# Patient Record
Sex: FEMALE | Race: WHITE | NOT HISPANIC OR LATINO | Employment: OTHER | URBAN - METROPOLITAN AREA
[De-identification: names, ages, dates, MRNs, and addresses within clinical notes are randomized per-mention and may not be internally consistent; named-entity substitution may affect disease eponyms.]

---

## 2023-05-02 DIAGNOSIS — E03.8 OTHER SPECIFIED HYPOTHYROIDISM: Primary | ICD-10-CM

## 2023-05-02 PROBLEM — E03.9 HYPOTHYROIDISM: Status: ACTIVE | Noted: 2023-05-02

## 2023-05-02 RX ORDER — LEVOTHYROXINE SODIUM 75 UG/1
1 TABLET ORAL DAILY
COMMUNITY
Start: 2014-12-29 | End: 2024-01-18 | Stop reason: SDUPTHER

## 2023-05-02 RX ORDER — LEVOTHYROXINE SODIUM 75 UG/1
TABLET ORAL
Qty: 30 TABLET | Refills: 0 | Status: SHIPPED | OUTPATIENT
Start: 2023-05-02 | End: 2023-08-29

## 2023-08-29 DIAGNOSIS — E03.8 OTHER SPECIFIED HYPOTHYROIDISM: ICD-10-CM

## 2023-08-29 RX ORDER — LEVOTHYROXINE SODIUM 75 UG/1
TABLET ORAL
Qty: 30 TABLET | Refills: 11 | Status: SHIPPED | OUTPATIENT
Start: 2023-08-29 | End: 2023-11-01 | Stop reason: SDUPTHER

## 2023-10-10 DIAGNOSIS — F41.9 ANXIETY: Primary | ICD-10-CM

## 2023-10-10 RX ORDER — ESCITALOPRAM OXALATE 10 MG/1
10 TABLET ORAL DAILY
Qty: 90 TABLET | Refills: 3 | Status: SHIPPED | OUTPATIENT
Start: 2023-10-10 | End: 2023-10-13 | Stop reason: SDUPTHER

## 2023-10-13 DIAGNOSIS — F41.9 ANXIETY: ICD-10-CM

## 2023-10-13 DIAGNOSIS — E03.9 HYPOTHYROIDISM, UNSPECIFIED TYPE: ICD-10-CM

## 2023-10-13 RX ORDER — ESCITALOPRAM OXALATE 10 MG/1
10 TABLET ORAL DAILY
Qty: 90 TABLET | Refills: 3 | Status: SHIPPED | OUTPATIENT
Start: 2023-10-13

## 2023-10-13 RX ORDER — LEVOTHYROXINE SODIUM 75 UG/1
75 TABLET ORAL DAILY
Qty: 90 TABLET | Refills: 2 | Status: CANCELLED | OUTPATIENT
Start: 2023-10-13

## 2023-10-16 ENCOUNTER — LAB (OUTPATIENT)
Dept: LAB | Facility: LAB | Age: 78
End: 2023-10-16
Payer: MEDICARE

## 2023-10-16 DIAGNOSIS — E03.9 HYPOTHYROIDISM, UNSPECIFIED TYPE: ICD-10-CM

## 2023-10-16 LAB — TSH SERPL-ACNC: 3.58 MIU/L (ref 0.44–3.98)

## 2023-10-16 PROCEDURE — 36415 COLL VENOUS BLD VENIPUNCTURE: CPT

## 2023-10-16 PROCEDURE — 84443 ASSAY THYROID STIM HORMONE: CPT

## 2023-10-31 PROBLEM — I10 BENIGN ESSENTIAL HYPERTENSION: Status: ACTIVE | Noted: 2023-10-31

## 2023-10-31 PROBLEM — M81.0 OSTEOPOROSIS: Status: ACTIVE | Noted: 2023-10-31

## 2023-10-31 PROBLEM — G35 MULTIPLE SCLEROSIS (MULTI): Status: ACTIVE | Noted: 2023-10-31

## 2023-10-31 PROBLEM — N32.81 OVERACTIVE BLADDER: Status: ACTIVE | Noted: 2023-10-31

## 2023-10-31 PROBLEM — M25.50 ARTHRALGIA: Status: ACTIVE | Noted: 2023-10-31

## 2023-10-31 PROBLEM — G47.00 INSOMNIA: Status: ACTIVE | Noted: 2023-10-31

## 2023-10-31 PROBLEM — M19.90 OSTEOARTHROSIS: Status: ACTIVE | Noted: 2023-10-31

## 2023-10-31 PROBLEM — E78.5 HYPERLIPIDEMIA: Status: ACTIVE | Noted: 2023-10-31

## 2023-10-31 PROBLEM — M48.062 LUMBAR STENOSIS WITH NEUROGENIC CLAUDICATION: Status: ACTIVE | Noted: 2023-10-31

## 2023-10-31 PROBLEM — L65.9 ALOPECIA: Status: ACTIVE | Noted: 2023-10-31

## 2023-10-31 PROBLEM — F41.9 ANXIETY DISORDER: Status: ACTIVE | Noted: 2023-10-31

## 2023-10-31 RX ORDER — NAPROXEN SODIUM 220 MG/1
1 TABLET, FILM COATED ORAL DAILY
COMMUNITY

## 2023-10-31 RX ORDER — POLYETHYLENE GLYCOL 3350 17 G/17G
POWDER, FOR SOLUTION ORAL
COMMUNITY
Start: 2013-06-28

## 2023-10-31 RX ORDER — PRAVASTATIN SODIUM 20 MG/1
TABLET ORAL
COMMUNITY
Start: 2021-07-19 | End: 2023-12-26

## 2023-10-31 RX ORDER — MIRABEGRON 25 MG/1
TABLET, FILM COATED, EXTENDED RELEASE ORAL
COMMUNITY
Start: 2022-04-04

## 2023-10-31 RX ORDER — MINERAL OIL
ENEMA (ML) RECTAL
COMMUNITY
Start: 2013-06-28

## 2023-10-31 RX ORDER — SOLIFENACIN SUCCINATE 5 MG/1
TABLET, FILM COATED ORAL
COMMUNITY
Start: 2019-03-14

## 2023-10-31 RX ORDER — METOPROLOL SUCCINATE 25 MG/1
TABLET, EXTENDED RELEASE ORAL
COMMUNITY
Start: 2020-07-17 | End: 2023-12-26

## 2023-11-01 ENCOUNTER — OFFICE VISIT (OUTPATIENT)
Dept: PRIMARY CARE | Facility: CLINIC | Age: 78
End: 2023-11-01
Payer: MEDICARE

## 2023-11-01 VITALS
HEIGHT: 60 IN | OXYGEN SATURATION: 97 % | TEMPERATURE: 97 F | RESPIRATION RATE: 16 BRPM | DIASTOLIC BLOOD PRESSURE: 80 MMHG | BODY MASS INDEX: 30.47 KG/M2 | SYSTOLIC BLOOD PRESSURE: 137 MMHG | HEART RATE: 56 BPM | WEIGHT: 155.2 LBS

## 2023-11-01 DIAGNOSIS — E78.2 MIXED HYPERLIPIDEMIA: ICD-10-CM

## 2023-11-01 DIAGNOSIS — F41.1 GENERALIZED ANXIETY DISORDER: ICD-10-CM

## 2023-11-01 DIAGNOSIS — E03.8 OTHER SPECIFIED HYPOTHYROIDISM: ICD-10-CM

## 2023-11-01 DIAGNOSIS — I10 BENIGN ESSENTIAL HYPERTENSION: ICD-10-CM

## 2023-11-01 DIAGNOSIS — M81.0 AGE-RELATED OSTEOPOROSIS WITHOUT CURRENT PATHOLOGICAL FRACTURE: ICD-10-CM

## 2023-11-01 DIAGNOSIS — Z23 NEED FOR VACCINATION: ICD-10-CM

## 2023-11-01 DIAGNOSIS — Z00.00 ROUTINE GENERAL MEDICAL EXAMINATION AT HEALTH CARE FACILITY: Primary | ICD-10-CM

## 2023-11-01 DIAGNOSIS — Z12.31 ENCOUNTER FOR SCREENING MAMMOGRAM FOR BREAST CANCER: ICD-10-CM

## 2023-11-01 DIAGNOSIS — G35 MULTIPLE SCLEROSIS (MULTI): ICD-10-CM

## 2023-11-01 PROCEDURE — 1160F RVW MEDS BY RX/DR IN RCRD: CPT | Performed by: INTERNAL MEDICINE

## 2023-11-01 PROCEDURE — 3079F DIAST BP 80-89 MM HG: CPT | Performed by: INTERNAL MEDICINE

## 2023-11-01 PROCEDURE — 90677 PCV20 VACCINE IM: CPT | Performed by: INTERNAL MEDICINE

## 2023-11-01 PROCEDURE — 99214 OFFICE O/P EST MOD 30 MIN: CPT | Performed by: INTERNAL MEDICINE

## 2023-11-01 PROCEDURE — 1170F FXNL STATUS ASSESSED: CPT | Performed by: INTERNAL MEDICINE

## 2023-11-01 PROCEDURE — 1159F MED LIST DOCD IN RCRD: CPT | Performed by: INTERNAL MEDICINE

## 2023-11-01 PROCEDURE — G0439 PPPS, SUBSEQ VISIT: HCPCS | Performed by: INTERNAL MEDICINE

## 2023-11-01 PROCEDURE — G0009 ADMIN PNEUMOCOCCAL VACCINE: HCPCS | Performed by: INTERNAL MEDICINE

## 2023-11-01 PROCEDURE — 1125F AMNT PAIN NOTED PAIN PRSNT: CPT | Performed by: INTERNAL MEDICINE

## 2023-11-01 PROCEDURE — 1036F TOBACCO NON-USER: CPT | Performed by: INTERNAL MEDICINE

## 2023-11-01 PROCEDURE — 3075F SYST BP GE 130 - 139MM HG: CPT | Performed by: INTERNAL MEDICINE

## 2023-11-01 ASSESSMENT — ENCOUNTER SYMPTOMS
BRUISES/BLEEDS EASILY: 0
HEADACHES: 0
EYE REDNESS: 0
SORE THROAT: 0
NECK STIFFNESS: 0
GASTROINTESTINAL NEGATIVE: 1
TROUBLE SWALLOWING: 0
UNEXPECTED WEIGHT CHANGE: 0
LIGHT-HEADEDNESS: 0
PSYCHIATRIC NEGATIVE: 1
ADENOPATHY: 0
EYES NEGATIVE: 1
POLYPHAGIA: 0
BLOOD IN STOOL: 0
SHORTNESS OF BREATH: 0
BACK PAIN: 1
DIZZINESS: 0
SPEECH DIFFICULTY: 0
VOMITING: 0
ALLERGIC/IMMUNOLOGIC NEGATIVE: 1
VOICE CHANGE: 0
WEAKNESS: 0
EYE DISCHARGE: 0
FREQUENCY: 0
LOSS OF SENSATION IN FEET: 0
EYE PAIN: 0
DIARRHEA: 0
NECK PAIN: 0
OCCASIONAL FEELINGS OF UNSTEADINESS: 0
TREMORS: 0
WOUND: 0
DEPRESSION: 0
HALLUCINATIONS: 0
CONFUSION: 0
FLANK PAIN: 0
SINUS PAIN: 0
APPETITE CHANGE: 0
JOINT SWELLING: 0
PALPITATIONS: 0
ARTHRALGIAS: 1
AGITATION: 0
HEMATOLOGIC/LYMPHATIC NEGATIVE: 1
NUMBNESS: 0
SINUS PRESSURE: 0
STRIDOR: 0
NAUSEA: 0
NEUROLOGICAL NEGATIVE: 1
WHEEZING: 0
SEIZURES: 0
CARDIOVASCULAR NEGATIVE: 1
CONSTIPATION: 0
ABDOMINAL PAIN: 0
RESPIRATORY NEGATIVE: 1
ACTIVITY CHANGE: 0
DIFFICULTY URINATING: 0
DYSURIA: 0
COLOR CHANGE: 0
MYALGIAS: 0
FACIAL ASYMMETRY: 0
CHEST TIGHTNESS: 0
NERVOUS/ANXIOUS: 0
SLEEP DISTURBANCE: 0
ENDOCRINE NEGATIVE: 1
COUGH: 0
CONSTITUTIONAL NEGATIVE: 1
POLYDIPSIA: 0

## 2023-11-01 ASSESSMENT — ACTIVITIES OF DAILY LIVING (ADL)
BATHING: INDEPENDENT
DRESSING: INDEPENDENT
GROCERY_SHOPPING: INDEPENDENT
DOING_HOUSEWORK: INDEPENDENT
MANAGING_FINANCES: INDEPENDENT
TAKING_MEDICATION: INDEPENDENT

## 2023-11-01 ASSESSMENT — PATIENT HEALTH QUESTIONNAIRE - PHQ9
1. LITTLE INTEREST OR PLEASURE IN DOING THINGS: NOT AT ALL
SUM OF ALL RESPONSES TO PHQ9 QUESTIONS 1 AND 2: 0
2. FEELING DOWN, DEPRESSED OR HOPELESS: NOT AT ALL

## 2023-11-01 NOTE — PROGRESS NOTES
Subjective   Reason for Visit: Jenni Friedman is an 78 y.o. female here for a Medicare Wellness visit.     Past Medical, Surgical, and Family History reviewed and updated in chart.    Reviewed all medications by prescribing practitioner or clinical pharmacist (such as prescriptions, OTCs, herbal therapies and supplements) and documented in the medical record.    [facility]    Patient Care Team:  Bouchra Harris MD PhD as PCP - General     Patient has been feeling pretty good and has been complaint with prescribed medications.    She  accidentally fell in October 2022 at home.  Sustained right wrist fracture.  Saw ortho Dr. Reynolds, required surgery which was performed in Nov 2022 at [facility].   Completed OT, doing well now, full range of motion returned.     We reviewed and discussed details of recent blood work: CBC, CMP, TSH, Lipid panel, Hb A1c, Vit D done in 2022 and TSH in 2023.  Results within acceptable range.  Mildly elevated cholesterol and glucose  noted.       Mammogram: 1/2022  DEXA: 1/2022: osteoporosis  Adv Dir:  POA forms; on file. No Living will on file  Pneumonia vacc: 2018    Her CT CAC in 2017 was zero.   Recent colonoscopy (7/20/17) was unremarkable.     MS symptoms stable.  Follows with neurology as needed.        Review of Systems   Constitutional: Negative.  Negative for activity change, appetite change and unexpected weight change.   HENT: Negative.  Negative for congestion, ear discharge, ear pain, hearing loss, mouth sores, nosebleeds, sinus pressure, sinus pain, sore throat, trouble swallowing and voice change.    Eyes: Negative.  Negative for pain, discharge, redness and visual disturbance.   Respiratory: Negative.  Negative for cough, chest tightness, shortness of breath, wheezing and stridor.    Cardiovascular: Negative.  Negative for chest pain, palpitations and leg swelling.   Gastrointestinal: Negative.  Negative for abdominal pain, blood in stool, constipation, diarrhea, nausea  and vomiting.   Endocrine: Negative.  Negative for polydipsia, polyphagia and polyuria.   Genitourinary: Negative.  Negative for difficulty urinating, dysuria, flank pain, frequency and urgency.   Musculoskeletal:  Positive for arthralgias and back pain. Negative for gait problem, joint swelling, myalgias, neck pain and neck stiffness.   Skin: Negative.  Negative for color change, rash and wound.   Allergic/Immunologic: Negative.  Negative for environmental allergies, food allergies and immunocompromised state.   Neurological: Negative.  Negative for dizziness, tremors, seizures, syncope, facial asymmetry, speech difficulty, weakness, light-headedness, numbness and headaches.   Hematological: Negative.  Negative for adenopathy. Does not bruise/bleed easily.   Psychiatric/Behavioral: Negative.  Negative for agitation, behavioral problems, confusion, hallucinations, sleep disturbance and suicidal ideas. The patient is not nervous/anxious.    All other systems reviewed and are negative.      Objective   Vitals:  /80   Pulse 56   Temp 36.1 °C (97 °F)   Resp 16   Ht 1.524 m (5')   Wt 70.4 kg (155 lb 3.2 oz)   SpO2 97%   BMI 30.31 kg/m²       Physical Exam  Vitals and nursing note reviewed.   Constitutional:       General: She is not in acute distress.     Appearance: Normal appearance.   HENT:      Head: Normocephalic and atraumatic.      Right Ear: Tympanic membrane, ear canal and external ear normal.      Left Ear: Tympanic membrane, ear canal and external ear normal.      Nose: Nose normal. No congestion or rhinorrhea.      Mouth/Throat:      Mouth: Mucous membranes are moist.      Pharynx: Oropharynx is clear.   Eyes:      General:         Right eye: No discharge.         Left eye: No discharge.      Extraocular Movements: Extraocular movements intact.      Conjunctiva/sclera: Conjunctivae normal.      Pupils: Pupils are equal, round, and reactive to light.   Cardiovascular:      Rate and Rhythm: Normal  rate and regular rhythm.      Pulses: Normal pulses.      Heart sounds: Normal heart sounds. No murmur heard.     No friction rub. No gallop.   Pulmonary:      Effort: Pulmonary effort is normal. No respiratory distress.      Breath sounds: Normal breath sounds. No stridor. No wheezing, rhonchi or rales.   Chest:      Chest wall: No tenderness.   Abdominal:      General: Bowel sounds are normal.      Palpations: Abdomen is soft. There is no mass.      Tenderness: There is no abdominal tenderness. There is no guarding or rebound.   Musculoskeletal:         General: No swelling or deformity. Normal range of motion.      Cervical back: Normal range of motion and neck supple. No rigidity or tenderness.      Right lower leg: No edema.      Left lower leg: No edema.   Lymphadenopathy:      Cervical: No cervical adenopathy.   Skin:     General: Skin is warm and dry.      Coloration: Skin is not jaundiced.      Findings: No bruising or erythema.   Neurological:      General: No focal deficit present.      Mental Status: She is alert and oriented to person, place, and time. Mental status is at baseline.      Cranial Nerves: No cranial nerve deficit.      Motor: No weakness.      Coordination: Coordination normal.      Gait: Gait normal.   Psychiatric:         Mood and Affect: Mood normal.         Behavior: Behavior normal.         Thought Content: Thought content normal.         Judgment: Judgment normal.         Assessment/Plan   Problem List Items Addressed This Visit          Cardiac and Vasculature    Benign essential hypertension    Current Assessment & Plan     Controlled. Continue current treatment.          Hyperlipidemia    Current Assessment & Plan     Clinically stable. Continue statin.         Relevant Orders    Comprehensive Metabolic Panel    Lipid Panel       Endocrine/Metabolic    Hypothyroidism    Current Assessment & Plan     Clinically stable. Continue levothyroxine.          Osteoporosis    Current  Assessment & Plan     Please maintain enough calcium in your diet:  7367-8124 mg daily (consume foods rich in calcium rather than taking only calcium supplement to meet daily calcium requirements), take daily Vitamin D supplement with meal. Average Vit D dose is 2000 international units daily.  Weight bearing exercise routine is recommended.          Relevant Orders    Vitamin D 25-Hydroxy,Total (for eval of Vitamin D levels)       Health Encounters    Routine general medical examination at health care facility - Primary       Mental Health    Anxiety disorder    Current Assessment & Plan     Clinically stable. Continue lexapro.             Neuro    Multiple sclerosis (CMS/HCC)    Current Assessment & Plan     Clinically stable. F/up with neurology.          Relevant Orders    CBC     Other Visit Diagnoses       Need for vaccination        Relevant Orders    Pneumococcal conjugate vaccine 20-valent IM (Completed)    Encounter for screening mammogram for breast cancer        Relevant Orders    BI mammo bilateral screening tomosynthesis             Patient was identified as a fall risk. Risk prevention instructions provided.    It was a pleasure to see you today.  I would like to remind you about importance of a healthy lifestyle in order to improve your well-being and live longer.  Try to engage in physical activities for at least 150 minutes per week.  Eat about 10 servings of fruits and vegetables daily. My advice is 2 servings of fruits and 8 servings of vegetables.  For vegetables choose at least half of them green and at least half of them fresh.  Please avoid sugar, salt, fried food and saturated fat.    I spent a total of 30 minutes on the date of service for follow up visit, which included preparing to see the patient, face-to-face patient care, completing clinical documentation, obtaining and/or reviewing separately obtained history, performing a medically appropriate examination, counseling and educating  the patient/family/caregiver, ordering medications, tests, or procedures, communicating with other health care providers (not separately reported), independently interpreting results (not separately reported), communicating results to the patient/family/caregiver, and care coordination (not separately reported).    Please bring copy of your Healthcare Living Will and POA for next visit as discussed.    F/up in 6 months or sooner if needed.

## 2023-11-01 NOTE — ASSESSMENT & PLAN NOTE
Please maintain enough calcium in your diet:  4816-3034 mg daily (consume foods rich in calcium rather than taking only calcium supplement to meet daily calcium requirements), take daily Vitamin D supplement with meal. Average Vit D dose is 2000 international units daily.  Weight bearing exercise routine is recommended.

## 2023-11-01 NOTE — PATIENT INSTRUCTIONS

## 2023-11-06 ENCOUNTER — LAB (OUTPATIENT)
Dept: LAB | Facility: LAB | Age: 78
End: 2023-11-06
Payer: MEDICARE

## 2023-11-06 DIAGNOSIS — E78.2 MIXED HYPERLIPIDEMIA: ICD-10-CM

## 2023-11-06 DIAGNOSIS — M81.0 AGE-RELATED OSTEOPOROSIS WITHOUT CURRENT PATHOLOGICAL FRACTURE: ICD-10-CM

## 2023-11-06 LAB
25(OH)D3 SERPL-MCNC: 56 NG/ML (ref 30–100)
ALBUMIN SERPL BCP-MCNC: 4.3 G/DL (ref 3.4–5)
ALP SERPL-CCNC: 46 U/L (ref 33–136)
ALT SERPL W P-5'-P-CCNC: 19 U/L (ref 7–45)
ANION GAP SERPL CALC-SCNC: 11 MMOL/L (ref 10–20)
AST SERPL W P-5'-P-CCNC: 25 U/L (ref 9–39)
BILIRUB SERPL-MCNC: 1 MG/DL (ref 0–1.2)
BUN SERPL-MCNC: 10 MG/DL (ref 6–23)
CALCIUM SERPL-MCNC: 9.5 MG/DL (ref 8.6–10.3)
CHLORIDE SERPL-SCNC: 103 MMOL/L (ref 98–107)
CHOLEST SERPL-MCNC: 245 MG/DL (ref 0–199)
CHOLESTEROL/HDL RATIO: 2.2
CO2 SERPL-SCNC: 27 MMOL/L (ref 21–32)
CREAT SERPL-MCNC: 0.79 MG/DL (ref 0.5–1.05)
GFR SERPL CREATININE-BSD FRML MDRD: 76 ML/MIN/1.73M*2
GLUCOSE SERPL-MCNC: 116 MG/DL (ref 74–99)
HDLC SERPL-MCNC: 108.9 MG/DL
LDLC SERPL CALC-MCNC: 115 MG/DL
NON HDL CHOLESTEROL: 136 MG/DL (ref 0–149)
POTASSIUM SERPL-SCNC: 4.2 MMOL/L (ref 3.5–5.3)
PROT SERPL-MCNC: 6.9 G/DL (ref 6.4–8.2)
SODIUM SERPL-SCNC: 137 MMOL/L (ref 136–145)
TRIGL SERPL-MCNC: 104 MG/DL (ref 0–149)
VLDL: 21 MG/DL (ref 0–40)

## 2023-11-06 PROCEDURE — 80061 LIPID PANEL: CPT

## 2023-11-06 PROCEDURE — 36415 COLL VENOUS BLD VENIPUNCTURE: CPT

## 2023-11-06 PROCEDURE — 82306 VITAMIN D 25 HYDROXY: CPT

## 2023-11-06 PROCEDURE — 80053 COMPREHEN METABOLIC PANEL: CPT

## 2023-11-27 ENCOUNTER — APPOINTMENT (OUTPATIENT)
Dept: RADIOLOGY | Facility: CLINIC | Age: 78
End: 2023-11-27
Payer: MEDICARE

## 2023-11-28 ENCOUNTER — APPOINTMENT (OUTPATIENT)
Dept: RADIOLOGY | Facility: CLINIC | Age: 78
End: 2023-11-28
Payer: MEDICARE

## 2023-12-13 ENCOUNTER — ANCILLARY PROCEDURE (OUTPATIENT)
Dept: RADIOLOGY | Facility: CLINIC | Age: 78
End: 2023-12-13
Payer: MEDICARE

## 2023-12-13 DIAGNOSIS — Z12.31 ENCOUNTER FOR SCREENING MAMMOGRAM FOR BREAST CANCER: ICD-10-CM

## 2023-12-13 PROCEDURE — 77067 SCR MAMMO BI INCL CAD: CPT | Performed by: RADIOLOGY

## 2023-12-13 PROCEDURE — 77067 SCR MAMMO BI INCL CAD: CPT

## 2023-12-13 PROCEDURE — 77063 BREAST TOMOSYNTHESIS BI: CPT | Performed by: RADIOLOGY

## 2023-12-24 DIAGNOSIS — I10 BENIGN ESSENTIAL HYPERTENSION: Primary | ICD-10-CM

## 2023-12-24 DIAGNOSIS — E78.2 MIXED HYPERLIPIDEMIA: ICD-10-CM

## 2023-12-26 RX ORDER — METOPROLOL SUCCINATE 25 MG/1
25 TABLET, EXTENDED RELEASE ORAL DAILY
Qty: 90 TABLET | Refills: 3 | Status: SHIPPED | OUTPATIENT
Start: 2023-12-26

## 2023-12-26 RX ORDER — PRAVASTATIN SODIUM 20 MG/1
20 TABLET ORAL DAILY
Qty: 90 TABLET | Refills: 3 | Status: SHIPPED | OUTPATIENT
Start: 2023-12-26

## 2024-01-18 DIAGNOSIS — E03.9 HYPOTHYROIDISM, UNSPECIFIED TYPE: Primary | ICD-10-CM

## 2024-01-18 RX ORDER — LEVOTHYROXINE SODIUM 75 UG/1
75 TABLET ORAL DAILY
Qty: 90 TABLET | Refills: 2 | Status: SHIPPED | OUTPATIENT
Start: 2024-01-18

## 2024-05-01 ENCOUNTER — OFFICE VISIT (OUTPATIENT)
Dept: PRIMARY CARE | Facility: CLINIC | Age: 79
End: 2024-05-01
Payer: MEDICARE

## 2024-05-01 VITALS
OXYGEN SATURATION: 96 % | SYSTOLIC BLOOD PRESSURE: 136 MMHG | RESPIRATION RATE: 16 BRPM | WEIGHT: 156 LBS | BODY MASS INDEX: 30.63 KG/M2 | HEART RATE: 72 BPM | TEMPERATURE: 98 F | HEIGHT: 60 IN | DIASTOLIC BLOOD PRESSURE: 80 MMHG

## 2024-05-01 DIAGNOSIS — E03.9 HYPOTHYROIDISM, UNSPECIFIED TYPE: ICD-10-CM

## 2024-05-01 DIAGNOSIS — M81.0 AGE RELATED OSTEOPOROSIS, UNSPECIFIED PATHOLOGICAL FRACTURE PRESENCE: ICD-10-CM

## 2024-05-01 DIAGNOSIS — I10 BENIGN ESSENTIAL HYPERTENSION: Primary | ICD-10-CM

## 2024-05-01 DIAGNOSIS — G35 MULTIPLE SCLEROSIS (MULTI): ICD-10-CM

## 2024-05-01 DIAGNOSIS — M48.062 LUMBAR STENOSIS WITH NEUROGENIC CLAUDICATION: ICD-10-CM

## 2024-05-01 DIAGNOSIS — R53.83 OTHER FATIGUE: ICD-10-CM

## 2024-05-01 DIAGNOSIS — E78.2 MIXED HYPERLIPIDEMIA: ICD-10-CM

## 2024-05-01 PROCEDURE — 1160F RVW MEDS BY RX/DR IN RCRD: CPT | Performed by: INTERNAL MEDICINE

## 2024-05-01 PROCEDURE — 1157F ADVNC CARE PLAN IN RCRD: CPT | Performed by: INTERNAL MEDICINE

## 2024-05-01 PROCEDURE — 3075F SYST BP GE 130 - 139MM HG: CPT | Performed by: INTERNAL MEDICINE

## 2024-05-01 PROCEDURE — 1159F MED LIST DOCD IN RCRD: CPT | Performed by: INTERNAL MEDICINE

## 2024-05-01 PROCEDURE — 99214 OFFICE O/P EST MOD 30 MIN: CPT | Performed by: INTERNAL MEDICINE

## 2024-05-01 PROCEDURE — 1036F TOBACCO NON-USER: CPT | Performed by: INTERNAL MEDICINE

## 2024-05-01 PROCEDURE — 3079F DIAST BP 80-89 MM HG: CPT | Performed by: INTERNAL MEDICINE

## 2024-05-01 ASSESSMENT — ENCOUNTER SYMPTOMS
SINUS PAIN: 0
VOICE CHANGE: 0
FACIAL ASYMMETRY: 0
SINUS PRESSURE: 0
STRIDOR: 0
LIGHT-HEADEDNESS: 0
COUGH: 0
COLOR CHANGE: 0
ENDOCRINE NEGATIVE: 1
NAUSEA: 0
CONFUSION: 0
WHEEZING: 0
DYSURIA: 0
AGITATION: 0
NUMBNESS: 0
NERVOUS/ANXIOUS: 0
POLYDIPSIA: 0
ALLERGIC/IMMUNOLOGIC NEGATIVE: 1
EYE REDNESS: 0
SLEEP DISTURBANCE: 0
ABDOMINAL PAIN: 0
EYE DISCHARGE: 0
POLYPHAGIA: 0
JOINT SWELLING: 0
SHORTNESS OF BREATH: 0
BACK PAIN: 1
UNEXPECTED WEIGHT CHANGE: 0
ARTHRALGIAS: 1
FLANK PAIN: 0
HALLUCINATIONS: 0
DIZZINESS: 0
TROUBLE SWALLOWING: 0
WEAKNESS: 0
BRUISES/BLEEDS EASILY: 0
HEADACHES: 0
CARDIOVASCULAR NEGATIVE: 1
GASTROINTESTINAL NEGATIVE: 1
CHEST TIGHTNESS: 0
ACTIVITY CHANGE: 0
PALPITATIONS: 0
DIARRHEA: 0
SPEECH DIFFICULTY: 0
PSYCHIATRIC NEGATIVE: 1
EYES NEGATIVE: 1
SORE THROAT: 0
WOUND: 0
NECK PAIN: 0
BLOOD IN STOOL: 0
SEIZURES: 0
APPETITE CHANGE: 0
DIFFICULTY URINATING: 0
MYALGIAS: 0
HEMATOLOGIC/LYMPHATIC NEGATIVE: 1
FREQUENCY: 0
RESPIRATORY NEGATIVE: 1
CONSTITUTIONAL NEGATIVE: 1
TREMORS: 0
NECK STIFFNESS: 0
VOMITING: 0
CONSTIPATION: 0
ADENOPATHY: 0
EYE PAIN: 0

## 2024-05-01 ASSESSMENT — PATIENT HEALTH QUESTIONNAIRE - PHQ9
SUM OF ALL RESPONSES TO PHQ9 QUESTIONS 1 AND 2: 0
1. LITTLE INTEREST OR PLEASURE IN DOING THINGS: NOT AT ALL
2. FEELING DOWN, DEPRESSED OR HOPELESS: NOT AT ALL

## 2024-05-01 NOTE — PROGRESS NOTES
Subjective   Patient ID: Jenni Friedman is a 79 y.o. female who presents for Follow-up (Pt is here today due to a 6 month follow up).  HPI    Patient has been feeling pretty good and has been complaint with prescribed medications.      C/o back pain, no recent fall or injury, walks with cane for support, symptom slightly improving.     We reviewed and discussed details of recent blood work: CBC, CMP, TSH, Lipid panel, Hb A1c, Vit D done in 2023. Results within acceptable range.    Had mammogram in Dec 2023.  She has been undergoing extensive dental work. Needs bone grafting.     C/o back pain, no recent fall or injury.    She  accidentally fell in October 2022 at home.  Sustained right wrist fracture.  Saw ortho Dr. Reynolds, required surgery which was performed in Nov 2022 at [facility].    Her CT CAC in 2017 was zero.   Recent colonoscopy (7/20/17) was unremarkable.      MS symptoms stable.  Follows with neurology as needed.       Review of Systems   Constitutional: Negative.  Negative for activity change, appetite change and unexpected weight change.   HENT: Negative.  Negative for congestion, ear discharge, ear pain, hearing loss, mouth sores, nosebleeds, sinus pressure, sinus pain, sore throat, trouble swallowing and voice change.    Eyes: Negative.  Negative for pain, discharge, redness and visual disturbance.   Respiratory: Negative.  Negative for cough, chest tightness, shortness of breath, wheezing and stridor.    Cardiovascular: Negative.  Negative for chest pain, palpitations and leg swelling.   Gastrointestinal: Negative.  Negative for abdominal pain, blood in stool, constipation, diarrhea, nausea and vomiting.   Endocrine: Negative.  Negative for polydipsia, polyphagia and polyuria.   Genitourinary: Negative.  Negative for difficulty urinating, dysuria, flank pain, frequency and urgency.   Musculoskeletal:  Positive for arthralgias, back pain and gait problem. Negative for joint swelling, myalgias,  neck pain and neck stiffness.   Skin: Negative.  Negative for color change, rash and wound.   Allergic/Immunologic: Negative.  Negative for environmental allergies, food allergies and immunocompromised state.   Neurological:  Negative for dizziness, tremors, seizures, syncope, facial asymmetry, speech difficulty, weakness, light-headedness, numbness and headaches.   Hematological: Negative.  Negative for adenopathy. Does not bruise/bleed easily.   Psychiatric/Behavioral: Negative.  Negative for agitation, behavioral problems, confusion, hallucinations, sleep disturbance and suicidal ideas. The patient is not nervous/anxious.    All other systems reviewed and are negative.      Objective     Review of systems was performed and all systems were negative except what in HPI    /80   Pulse 72   Temp 36.7 °C (98 °F) (Temporal)   Resp 16   Ht 1.524 m (5')   Wt 70.8 kg (156 lb)   SpO2 96%   BMI 30.47 kg/m²    Physical Exam  Vitals and nursing note reviewed.   Constitutional:       General: She is not in acute distress.     Appearance: Normal appearance.   HENT:      Head: Normocephalic and atraumatic.      Right Ear: External ear normal.      Left Ear: External ear normal.      Nose: Nose normal. No congestion or rhinorrhea.   Eyes:      General:         Right eye: No discharge.         Left eye: No discharge.      Extraocular Movements: Extraocular movements intact.      Conjunctiva/sclera: Conjunctivae normal.      Pupils: Pupils are equal, round, and reactive to light.   Cardiovascular:      Rate and Rhythm: Normal rate and regular rhythm.      Pulses: Normal pulses.      Heart sounds: Normal heart sounds. No murmur heard.     No friction rub. No gallop.   Pulmonary:      Effort: Pulmonary effort is normal. No respiratory distress.      Breath sounds: Normal breath sounds. No stridor. No wheezing, rhonchi or rales.   Chest:      Chest wall: No tenderness.   Abdominal:      General: Bowel sounds are normal.       Palpations: Abdomen is soft. There is no mass.      Tenderness: There is no abdominal tenderness. There is no guarding or rebound.   Musculoskeletal:         General: No swelling or deformity. Normal range of motion.      Cervical back: Normal range of motion and neck supple. No rigidity or tenderness.      Right lower leg: No edema.      Left lower leg: No edema.      Comments: Walks with cane   Lymphadenopathy:      Cervical: No cervical adenopathy.   Skin:     General: Skin is warm and dry.      Coloration: Skin is not jaundiced.      Findings: No bruising or erythema.   Neurological:      General: No focal deficit present.      Mental Status: She is alert and oriented to person, place, and time. Mental status is at baseline.      Cranial Nerves: No cranial nerve deficit.      Motor: No weakness.      Coordination: Coordination normal.      Gait: Gait normal.   Psychiatric:         Mood and Affect: Mood normal.         Behavior: Behavior normal.         Thought Content: Thought content normal.         Judgment: Judgment normal.         Assessment/Plan   Problem List Items Addressed This Visit             ICD-10-CM       Cardiac and Vasculature    Benign essential hypertension - Primary I10     Controlled. Continue current treatment.          Hyperlipidemia E78.5     Clinically stable. Continue statin.         Relevant Orders    Comprehensive Metabolic Panel    Lipid Panel       Endocrine/Metabolic    Hypothyroidism E03.9     Clinically stable. Continue levothyroxine.          Relevant Orders    TSH with reflex to Free T4 if abnormal    Osteoporosis M81.0     Please maintain enough calcium in your diet:  0781-8638 mg daily (consume foods rich in calcium rather than taking only calcium supplement to meet daily calcium requirements), take daily Vitamin D supplement with meal. Average Vit D dose is 2000 international units daily.  Weight bearing exercise routine is recommended.   Completed tx with oral  bisphosphonate few years ago.          Relevant Orders    Vitamin D 25-Hydroxy,Total (for eval of Vitamin D levels)       Neuro    Lumbar stenosis with neurogenic claudication M48.062     Clinically improving, let me know if symptoms worsen.          Multiple sclerosis (Multi) G35     Clinically stable. Will monitor.           Other Visit Diagnoses         Codes    Other fatigue     R53.83    Relevant Orders    CBC        It was a pleasure to see you today.  I would like to remind you about importance of a healthy lifestyle in order to improve your well-being and live longer.  Try to engage in physical activities for at least 150 minutes per week.  Eat about 10 servings of fruits and vegetables daily. My advice is 2 servings of fruits and 8 servings of vegetables.  For vegetables choose at least half of them green and at least half of them fresh.  Please avoid sugar, salt, fried food and saturated fat.    I spent a total of 36 minutes on the date of service for follow up visit, which included preparing to see the patient, face-to-face patient care, completing clinical documentation, obtaining and/or reviewing separately obtained history, performing a medically appropriate examination, counseling and educating the patient/family/caregiver, ordering medications, tests, or procedures, communicating with other health care providers (not separately reported), independently interpreting results (not separately reported), communicating results to the patient/family/caregiver, and care coordination (not separately reported).    F/up in 6 months or sooner if needed.

## 2024-05-01 NOTE — ASSESSMENT & PLAN NOTE
Please maintain enough calcium in your diet:  1687-2351 mg daily (consume foods rich in calcium rather than taking only calcium supplement to meet daily calcium requirements), take daily Vitamin D supplement with meal. Average Vit D dose is 2000 international units daily.  Weight bearing exercise routine is recommended.   Completed tx with oral bisphosphonate few years ago.

## 2024-05-08 ENCOUNTER — TELEPHONE (OUTPATIENT)
Dept: PRIMARY CARE | Facility: CLINIC | Age: 79
End: 2024-05-08
Payer: MEDICARE

## 2024-05-08 NOTE — TELEPHONE ENCOUNTER
Patient states this past Monday, she noticed a tick on her left arm. States she pulled it out of her arm and threw it away. States it was brown and circular shape. States she was experiencing joint pain but that subsided after she noticed and pulled the tick out.   Patient has not other symptoms now. Would like some advice on what she needs to do now.   If there anything preventive that she should do?     Please advise

## 2024-05-09 ENCOUNTER — TELEPHONE (OUTPATIENT)
Dept: PRIMARY CARE | Facility: CLINIC | Age: 79
End: 2024-05-09
Payer: MEDICARE

## 2024-09-03 RX ORDER — SOLIFENACIN SUCCINATE 5 MG/1
5 TABLET, FILM COATED ORAL DAILY
Qty: 90 TABLET | Refills: 0 | OUTPATIENT
Start: 2024-09-03

## 2024-09-16 ENCOUNTER — APPOINTMENT (OUTPATIENT)
Dept: OBSTETRICS AND GYNECOLOGY | Facility: CLINIC | Age: 79
End: 2024-09-16
Payer: MEDICARE

## 2024-09-18 DIAGNOSIS — E03.9 HYPOTHYROIDISM, UNSPECIFIED TYPE: ICD-10-CM

## 2024-09-18 DIAGNOSIS — F41.9 ANXIETY: ICD-10-CM

## 2024-09-18 RX ORDER — LEVOTHYROXINE SODIUM 75 UG/1
75 TABLET ORAL DAILY
Qty: 90 TABLET | Refills: 3 | Status: SHIPPED | OUTPATIENT
Start: 2024-09-18

## 2024-09-18 RX ORDER — ESCITALOPRAM OXALATE 10 MG/1
10 TABLET ORAL DAILY
Qty: 90 TABLET | Refills: 3 | Status: SHIPPED | OUTPATIENT
Start: 2024-09-18

## 2024-09-23 ENCOUNTER — APPOINTMENT (OUTPATIENT)
Dept: OBSTETRICS AND GYNECOLOGY | Facility: CLINIC | Age: 79
End: 2024-09-23
Payer: MEDICARE

## 2024-09-23 VITALS
HEIGHT: 60 IN | DIASTOLIC BLOOD PRESSURE: 70 MMHG | WEIGHT: 156 LBS | BODY MASS INDEX: 30.63 KG/M2 | SYSTOLIC BLOOD PRESSURE: 116 MMHG

## 2024-09-23 DIAGNOSIS — N32.81 OVERACTIVE BLADDER: Primary | ICD-10-CM

## 2024-09-23 PROCEDURE — 1036F TOBACCO NON-USER: CPT | Performed by: NURSE PRACTITIONER

## 2024-09-23 PROCEDURE — 3074F SYST BP LT 130 MM HG: CPT | Performed by: NURSE PRACTITIONER

## 2024-09-23 PROCEDURE — 3078F DIAST BP <80 MM HG: CPT | Performed by: NURSE PRACTITIONER

## 2024-09-23 PROCEDURE — 1160F RVW MEDS BY RX/DR IN RCRD: CPT | Performed by: NURSE PRACTITIONER

## 2024-09-23 PROCEDURE — 1157F ADVNC CARE PLAN IN RCRD: CPT | Performed by: NURSE PRACTITIONER

## 2024-09-23 PROCEDURE — 1126F AMNT PAIN NOTED NONE PRSNT: CPT | Performed by: NURSE PRACTITIONER

## 2024-09-23 PROCEDURE — 1159F MED LIST DOCD IN RCRD: CPT | Performed by: NURSE PRACTITIONER

## 2024-09-23 PROCEDURE — 99214 OFFICE O/P EST MOD 30 MIN: CPT | Performed by: NURSE PRACTITIONER

## 2024-09-23 RX ORDER — MIRABEGRON 25 MG/1
25 TABLET, FILM COATED, EXTENDED RELEASE ORAL DAILY
Qty: 90 TABLET | Refills: 3 | Status: SHIPPED | OUTPATIENT
Start: 2024-09-23 | End: 2025-09-23

## 2024-09-23 ASSESSMENT — PAIN SCALES - GENERAL: PAINLEVEL: 0-NO PAIN

## 2024-09-23 NOTE — PROGRESS NOTES
HISTORY OF PRESENT ILLNESS:  Jenni Friedman is a 79 y.o. female, who presents in follow up for OAB.      During last encounter on 4/4/22 with Dr. Ag, reviewed and agreed to the following: OAB - On Solifenacin 5 mg, discussed this medication's association with risk of dementia. Will call in V[facility] for the patient to check the price, patient will consider switching to Myrbetriq. If cost prohibitive, will remain on Vesicare 5 mg daily. POP - Continue to monitor, not bothersome.      Today she reports     Urinary Symptoms:   - She is still on Solifenacin.   - Patient gets up 1-2x per night with Solifenacin  - Bladder frequency is not bothersome, she is aware of her daily schedule, so she is able to manage her bladder sxs based on where she will be during the day.   - There have been occasions where she's had leaking, but overall is satisfied with her bladder sxs.   - She was on Myrbetriq which didn't work as well as Solifenacin and was more expensive.     PHYSICAL EXAMINATION:  No LMP recorded. Patient has had a hysterectomy.  Body mass index is 30.47 kg/m².  /70   Ht 1.524 m (5')   Wt 70.8 kg (156 lb)   BMI 30.47 kg/m²     Physical Exam  Constitutional:       Appearance: Normal appearance. She is normal weight.   Pulmonary:      Effort: Pulmonary effort is normal.   Neurological:      Mental Status: She is alert.   Psychiatric:         Mood and Affect: Mood normal.         Behavior: Behavior normal.         Thought Content: Thought content normal.         Judgment: Judgment normal.     IMPRESSION AND PLAN:  Jenni Friedman is a 79 y.o. who is being treated for OAB. She has MS.     Plan    1. OAB  - We reviewed solifenacin is associated with a risk of dementia, although patient does feel satisfied with her OAB symptoms on this medication.   - Discussed first-line intervention methods such as PFPT. The second line treatment would be medications.  - Third line options include PTNS once weekly for 12  weeks, Botox and SNM.   - Sent Rx of Myrbetriq 25 mg; take 1 pill by mouth daily. It appears to be $126 for 90 day supply. Discussed possible side effects including slightly elevated blood pressure. This medication may take up to 2-4 weeks to reach full effect.   - If this is inefficent, we will increase to 50mg once daily     All questions and concerns were answered and addressed.  The patient expressed understanding and agrees with the plan.     Follow-up in 1 month for medication check with JULIA Harris.     Scribe Attestation:   IRenita, am scribing for virtually, and in the presence of JULIA Harris on 09/23/2024 at 11:57 AM.     I, Concepcion Jacobson, personally performed the services described in the documentation as scribed in my presence and confirm it is both complete and accurate.

## 2024-10-02 ENCOUNTER — APPOINTMENT (OUTPATIENT)
Dept: OBSTETRICS AND GYNECOLOGY | Facility: CLINIC | Age: 79
End: 2024-10-02
Payer: MEDICARE

## 2024-10-21 ENCOUNTER — APPOINTMENT (OUTPATIENT)
Dept: OBSTETRICS AND GYNECOLOGY | Facility: CLINIC | Age: 79
End: 2024-10-21
Payer: MEDICARE

## 2024-11-11 ENCOUNTER — LAB (OUTPATIENT)
Dept: LAB | Facility: LAB | Age: 79
End: 2024-11-11
Payer: MEDICARE

## 2024-11-11 DIAGNOSIS — E78.2 MIXED HYPERLIPIDEMIA: ICD-10-CM

## 2024-11-11 DIAGNOSIS — E03.9 HYPOTHYROIDISM, UNSPECIFIED TYPE: ICD-10-CM

## 2024-11-11 DIAGNOSIS — M81.0 AGE RELATED OSTEOPOROSIS, UNSPECIFIED PATHOLOGICAL FRACTURE PRESENCE: ICD-10-CM

## 2024-11-11 DIAGNOSIS — R53.83 OTHER FATIGUE: ICD-10-CM

## 2024-11-11 LAB
25(OH)D3 SERPL-MCNC: 44 NG/ML (ref 30–100)
ALBUMIN SERPL BCP-MCNC: 4.4 G/DL (ref 3.4–5)
ALP SERPL-CCNC: 47 U/L (ref 33–136)
ALT SERPL W P-5'-P-CCNC: 24 U/L (ref 7–45)
ANION GAP SERPL CALC-SCNC: 16 MMOL/L (ref 10–20)
AST SERPL W P-5'-P-CCNC: 27 U/L (ref 9–39)
BILIRUB SERPL-MCNC: 1.1 MG/DL (ref 0–1.2)
BUN SERPL-MCNC: 13 MG/DL (ref 6–23)
CALCIUM SERPL-MCNC: 10.2 MG/DL (ref 8.6–10.6)
CHLORIDE SERPL-SCNC: 101 MMOL/L (ref 98–107)
CHOLEST SERPL-MCNC: 260 MG/DL (ref 0–199)
CHOLESTEROL/HDL RATIO: 2.3
CO2 SERPL-SCNC: 28 MMOL/L (ref 21–32)
CREAT SERPL-MCNC: 0.78 MG/DL (ref 0.5–1.05)
EGFRCR SERPLBLD CKD-EPI 2021: 77 ML/MIN/1.73M*2
ERYTHROCYTE [DISTWIDTH] IN BLOOD BY AUTOMATED COUNT: 13.6 % (ref 11.5–14.5)
GLUCOSE SERPL-MCNC: 111 MG/DL (ref 74–99)
HCT VFR BLD AUTO: 45.7 % (ref 36–46)
HDLC SERPL-MCNC: 110.9 MG/DL
HGB BLD-MCNC: 15 G/DL (ref 12–16)
LDLC SERPL CALC-MCNC: 131 MG/DL
MCH RBC QN AUTO: 31.4 PG (ref 26–34)
MCHC RBC AUTO-ENTMCNC: 32.8 G/DL (ref 32–36)
MCV RBC AUTO: 96 FL (ref 80–100)
NON HDL CHOLESTEROL: 149 MG/DL (ref 0–149)
NRBC BLD-RTO: 0 /100 WBCS (ref 0–0)
PLATELET # BLD AUTO: 262 X10*3/UL (ref 150–450)
POTASSIUM SERPL-SCNC: 5 MMOL/L (ref 3.5–5.3)
PROT SERPL-MCNC: 7 G/DL (ref 6.4–8.2)
RBC # BLD AUTO: 4.78 X10*6/UL (ref 4–5.2)
SODIUM SERPL-SCNC: 140 MMOL/L (ref 136–145)
TRIGL SERPL-MCNC: 90 MG/DL (ref 0–149)
TSH SERPL-ACNC: 3.96 MIU/L (ref 0.44–3.98)
VLDL: 18 MG/DL (ref 0–40)
WBC # BLD AUTO: 6.7 X10*3/UL (ref 4.4–11.3)

## 2024-11-11 PROCEDURE — 80061 LIPID PANEL: CPT

## 2024-11-11 PROCEDURE — 36415 COLL VENOUS BLD VENIPUNCTURE: CPT

## 2024-11-11 PROCEDURE — 84443 ASSAY THYROID STIM HORMONE: CPT

## 2024-11-11 PROCEDURE — 80053 COMPREHEN METABOLIC PANEL: CPT

## 2024-11-11 PROCEDURE — 85027 COMPLETE CBC AUTOMATED: CPT

## 2024-11-11 PROCEDURE — 82306 VITAMIN D 25 HYDROXY: CPT

## 2024-11-13 ENCOUNTER — APPOINTMENT (OUTPATIENT)
Dept: PRIMARY CARE | Facility: CLINIC | Age: 79
End: 2024-11-13
Payer: MEDICARE

## 2024-11-13 VITALS
BODY MASS INDEX: 30.19 KG/M2 | WEIGHT: 153.8 LBS | HEART RATE: 82 BPM | HEIGHT: 60 IN | DIASTOLIC BLOOD PRESSURE: 80 MMHG | RESPIRATION RATE: 16 BRPM | OXYGEN SATURATION: 95 % | SYSTOLIC BLOOD PRESSURE: 122 MMHG | TEMPERATURE: 98 F

## 2024-11-13 DIAGNOSIS — N32.81 OVERACTIVE BLADDER: ICD-10-CM

## 2024-11-13 DIAGNOSIS — Z78.0 ASYMPTOMATIC POSTMENOPAUSAL STATUS: ICD-10-CM

## 2024-11-13 DIAGNOSIS — Z00.00 ROUTINE GENERAL MEDICAL EXAMINATION AT HEALTH CARE FACILITY: Primary | ICD-10-CM

## 2024-11-13 DIAGNOSIS — E78.2 MIXED HYPERLIPIDEMIA: ICD-10-CM

## 2024-11-13 DIAGNOSIS — I10 BENIGN ESSENTIAL HYPERTENSION: ICD-10-CM

## 2024-11-13 DIAGNOSIS — M48.062 LUMBAR STENOSIS WITH NEUROGENIC CLAUDICATION: ICD-10-CM

## 2024-11-13 DIAGNOSIS — G83.10 PARESIS OF SINGLE LOWER EXTREMITY (MULTI): ICD-10-CM

## 2024-11-13 DIAGNOSIS — G35 MULTIPLE SCLEROSIS (MULTI): ICD-10-CM

## 2024-11-13 DIAGNOSIS — E03.9 HYPOTHYROIDISM, UNSPECIFIED TYPE: ICD-10-CM

## 2024-11-13 DIAGNOSIS — Z12.31 BREAST CANCER SCREENING BY MAMMOGRAM: ICD-10-CM

## 2024-11-13 DIAGNOSIS — M81.0 AGE RELATED OSTEOPOROSIS, UNSPECIFIED PATHOLOGICAL FRACTURE PRESENCE: ICD-10-CM

## 2024-11-13 PROCEDURE — 99214 OFFICE O/P EST MOD 30 MIN: CPT | Performed by: INTERNAL MEDICINE

## 2024-11-13 PROCEDURE — 1036F TOBACCO NON-USER: CPT | Performed by: INTERNAL MEDICINE

## 2024-11-13 PROCEDURE — 3074F SYST BP LT 130 MM HG: CPT | Performed by: INTERNAL MEDICINE

## 2024-11-13 PROCEDURE — 1123F ACP DISCUSS/DSCN MKR DOCD: CPT | Performed by: INTERNAL MEDICINE

## 2024-11-13 PROCEDURE — G0439 PPPS, SUBSEQ VISIT: HCPCS | Performed by: INTERNAL MEDICINE

## 2024-11-13 PROCEDURE — 1158F ADVNC CARE PLAN TLK DOCD: CPT | Performed by: INTERNAL MEDICINE

## 2024-11-13 PROCEDURE — 1157F ADVNC CARE PLAN IN RCRD: CPT | Performed by: INTERNAL MEDICINE

## 2024-11-13 PROCEDURE — 1170F FXNL STATUS ASSESSED: CPT | Performed by: INTERNAL MEDICINE

## 2024-11-13 PROCEDURE — 1159F MED LIST DOCD IN RCRD: CPT | Performed by: INTERNAL MEDICINE

## 2024-11-13 PROCEDURE — 1160F RVW MEDS BY RX/DR IN RCRD: CPT | Performed by: INTERNAL MEDICINE

## 2024-11-13 PROCEDURE — 3079F DIAST BP 80-89 MM HG: CPT | Performed by: INTERNAL MEDICINE

## 2024-11-13 ASSESSMENT — ENCOUNTER SYMPTOMS
STRIDOR: 0
NUMBNESS: 0
HEMATOLOGIC/LYMPHATIC NEGATIVE: 1
NAUSEA: 0
POLYPHAGIA: 0
POLYDIPSIA: 0
VOMITING: 0
CARDIOVASCULAR NEGATIVE: 1
TREMORS: 0
NECK STIFFNESS: 0
BLOOD IN STOOL: 0
SORE THROAT: 0
WHEEZING: 0
LIGHT-HEADEDNESS: 0
ENDOCRINE NEGATIVE: 1
SLEEP DISTURBANCE: 0
WEAKNESS: 0
HEADACHES: 0
ALLERGIC/IMMUNOLOGIC NEGATIVE: 1
DIARRHEA: 0
WOUND: 0
SPEECH DIFFICULTY: 0
COUGH: 0
EYE PAIN: 0
CHEST TIGHTNESS: 0
HALLUCINATIONS: 0
SINUS PAIN: 0
JOINT SWELLING: 0
DYSURIA: 0
ADENOPATHY: 0
COLOR CHANGE: 0
LOSS OF SENSATION IN FEET: 0
SHORTNESS OF BREATH: 0
PSYCHIATRIC NEGATIVE: 1
DIFFICULTY URINATING: 0
RESPIRATORY NEGATIVE: 1
FLANK PAIN: 0
DIZZINESS: 0
EYES NEGATIVE: 1
NECK PAIN: 0
NEUROLOGICAL NEGATIVE: 1
AGITATION: 0
DEPRESSION: 0
PALPITATIONS: 0
EYE DISCHARGE: 0
ABDOMINAL PAIN: 0
FREQUENCY: 0
EYE REDNESS: 0
CONSTITUTIONAL NEGATIVE: 1
NERVOUS/ANXIOUS: 0
UNEXPECTED WEIGHT CHANGE: 0
ARTHRALGIAS: 0
BACK PAIN: 1
TROUBLE SWALLOWING: 0
BRUISES/BLEEDS EASILY: 0
MYALGIAS: 0
GASTROINTESTINAL NEGATIVE: 1
APPETITE CHANGE: 0
SEIZURES: 0
FACIAL ASYMMETRY: 0
VOICE CHANGE: 0
ACTIVITY CHANGE: 0
OCCASIONAL FEELINGS OF UNSTEADINESS: 0
CONFUSION: 0
SINUS PRESSURE: 0
CONSTIPATION: 0

## 2024-11-13 ASSESSMENT — ACTIVITIES OF DAILY LIVING (ADL)
DOING_HOUSEWORK: INDEPENDENT
GROCERY_SHOPPING: INDEPENDENT
TAKING_MEDICATION: INDEPENDENT
BATHING: INDEPENDENT
DRESSING: INDEPENDENT
MANAGING_FINANCES: INDEPENDENT

## 2024-11-13 NOTE — ASSESSMENT & PLAN NOTE
Orders:    1 Year Follow Up In [facility] - PCP - Wellness Exam; Future    
Clinically improving, let me know if symptoms worsen.          
Clinically stable, will monitor.   Diagnosed with MS in 1996.         
Clinically stable. Continue levothyroxine.          
Clinically stable. Continue statin.         
Clinically stable. F/up with urology.         
Clinically stable. Will monitor.   Diagnosed with MS in 1996.         
Controlled. Continue current treatment.          
Please maintain enough calcium in your diet:  1575-8804 mg daily (consume foods rich in calcium rather than taking only calcium supplement to meet daily calcium requirements), take daily Vitamin D supplement with meal. Average Vit D dose is 2000 international units daily.  Weight bearing exercise routine is recommended.   Completed tx with oral bisphosphonate few years ago.          
S: 41yo POD#2 s/p LTCS. Pain is well controlled. She is tolerating a regular diet and passing flatus. She is voiding spontaneously, and ambulating without difficulty. Denies CP/SOB. Denies lightheadedness/dizziness. Denies N/V.    O:  Vitals:  Vital Signs Last 24 Hrs  T(C): 36.6 (30 Dec 2021 05:00), Max: 36.6 (29 Dec 2021 09:26)  T(F): 97.9 (30 Dec 2021 05:00), Max: 97.9 (30 Dec 2021 05:00)  HR: 76 (30 Dec 2021 05:00) (59 - 76)  BP: 114/69 (30 Dec 2021 05:00) (112/72 - 121/72)  BP(mean): 88 (29 Dec 2021 20:42) (88 - 88)  RR: 18 (30 Dec 2021 05:00) (18 - 18)  SpO2: 96% (30 Dec 2021 05:00) (96% - 99%)    MEDICATIONS  (STANDING):  acetaminophen     Tablet .. 975 milliGRAM(s) Oral <User Schedule>  chlorhexidine 2% Cloths 1 Application(s) Topical daily  diphtheria/tetanus/pertussis (acellular) Vaccine (ADAcel) 0.5 milliLiter(s) IntraMuscular once  heparin   Injectable 5000 Unit(s) SubCutaneous every 12 hours  ibuprofen  Tablet. 600 milliGRAM(s) Oral every 6 hours  lactated ringers. 1000 milliLiter(s) (125 mL/Hr) IV Continuous <Continuous>  levothyroxine 50 MICROGram(s) Oral daily  oxytocin Infusion 333.333 milliUNIT(s)/Min (1000 mL/Hr) IV Continuous <Continuous>  oxytocin Infusion 333.333 milliUNIT(s)/Min (1000 mL/Hr) IV Continuous <Continuous>  senna 2 Tablet(s) Oral at bedtime      MEDICATIONS  (PRN):  diphenhydrAMINE 25 milliGRAM(s) Oral every 6 hours PRN Pruritus  lanolin Ointment 1 Application(s) Topical every 6 hours PRN Sore Nipples  magnesium hydroxide Suspension 30 milliLiter(s) Oral two times a day PRN Constipation  oxyCODONE    IR 5 milliGRAM(s) Oral every 3 hours PRN Moderate to Severe Pain (4-10)  oxyCODONE    IR 5 milliGRAM(s) Oral once PRN Moderate to Severe Pain (4-10)  simethicone 80 milliGRAM(s) Chew every 4 hours PRN Gas      Labs:  Blood type: A Positive  Rubella IgG: RPR: Negative                          11.4<L>   11.11<H> >-----------< 122<L>    ( 12-29 @ 06:49 )             34.9        PE:  General: NAD  Abdomen: Soft, appropriately tender, incision c/d/i.  Extremities: No erythema, no pitting edema  
OB Progress Note:  Delivery, POD#1    S: 39yo POD#1 s/p LTCS . Pain well controlled, tolerating regular diet, not yet passing flatus, ambulating without difficulty, voiding spontaneously. Denies heavy vaginal bleeding, N/V, CP/SOB/lightheadedness/dizziness, headache, visual disturbances, epigastric pain.     O:   Vital Signs Last 24 Hrs  T(C): 36.6 (29 Dec 2021 06:26), Max: 37.3 (28 Dec 2021 07:36)  T(F): 97.9 (29 Dec 2021 06:26), Max: 99.1 (28 Dec 2021 07:36)  HR: 62 (29 Dec 2021 06:26) (50 - 82)  BP: 116/73 (29 Dec 2021 06:26) (93/51 - 138/67)  BP(mean): 94 (28 Dec 2021 14:40) (66 - 96)  RR: 18 (29 Dec 2021 06:26) (13 - 30)  SpO2: 100% (29 Dec 2021 06:26) (96% - 100%)    Labs:  Blood type: A Positive  Rubella IgG: RPR: Negative    PE:  General: NAD  Abdomen: Mildly distended, appropriately tender, Fundus firm, incision c/d/i.  VE: No heavy vaginal bleeding  Extremities: No erythema, no pitting edema

## 2024-11-13 NOTE — PROGRESS NOTES
Subjective   Reason for Visit: Jenni Friedman is an 79 y.o. female here for a Medicare Wellness visit.     Past Medical, Surgical, and Family History reviewed and updated in chart.    Reviewed all medications by prescribing practitioner or clinical pharmacist (such as prescriptions, OTCs, herbal therapies and supplements) and documented in the medical record.    HPI    Patient Care Team:  Bouchra Harris MD PhD as PCP - General  Bouchra Harris MD PhD as PCP - [facility] ACO Attributed Provider     Patient comes for Medicare Wellness, Physical Exam and Follow up visit.     Patient has been feeling pretty good and has been complaint with prescribed medications.    We reviewed blood work including CBC, CMP, TSH, Lipid Panel, HbA1c, Vit D level done in November 2024.  Results within acceptable range. Elevated cholesterol and glucose noted.     Mammogram: 12/2023  DEXA: 2022: osteoporosis  Colon ca screening: n/a  Pneumonia Vacc: 2023  Advance Directives: Healthcare Living Will and POA on file.   POA discussed, confirmed and documented in patient's  EPIC record.       C/o back pain, no recent fall or injury.     She  accidentally fell in October 2022 at home.  Sustained right wrist fracture.  Saw ortho Dr. Reynolds, required surgery which was performed in Nov 2022 at [facility].     Her CT CAC in 2017 was zero.   Recent colonoscopy (7/20/17) was unremarkable.      MS symptoms stable.  She was diagnosed with MS in 1996. Follows with neurology as needed.    Anxiety sx controlled with Lexapro.  Lives alone, independent, driving.      Review of Systems   Constitutional: Negative.  Negative for activity change, appetite change and unexpected weight change.   HENT: Negative.  Negative for congestion, ear discharge, ear pain, hearing loss, mouth sores, nosebleeds, sinus pressure, sinus pain, sore throat, trouble swallowing and voice change.    Eyes: Negative.  Negative for pain, discharge, redness and visual disturbance.    Respiratory: Negative.  Negative for cough, chest tightness, shortness of breath, wheezing and stridor.    Cardiovascular: Negative.  Negative for chest pain, palpitations and leg swelling.   Gastrointestinal: Negative.  Negative for abdominal pain, blood in stool, constipation, diarrhea, nausea and vomiting.   Endocrine: Negative.  Negative for polydipsia, polyphagia and polyuria.   Genitourinary: Negative.  Negative for difficulty urinating, dysuria, flank pain, frequency and urgency.   Musculoskeletal:  Positive for back pain. Negative for arthralgias, gait problem, joint swelling, myalgias, neck pain and neck stiffness.   Skin: Negative.  Negative for color change, rash and wound.   Allergic/Immunologic: Negative.  Negative for environmental allergies, food allergies and immunocompromised state.   Neurological: Negative.  Negative for dizziness, tremors, seizures, syncope, facial asymmetry, speech difficulty, weakness, light-headedness, numbness and headaches.   Hematological: Negative.  Negative for adenopathy. Does not bruise/bleed easily.   Psychiatric/Behavioral: Negative.  Negative for agitation, behavioral problems, confusion, hallucinations, sleep disturbance and suicidal ideas. The patient is not nervous/anxious.    All other systems reviewed and are negative.      Objective   Vitals:  /80 (BP Location: Right arm, Patient Position: Sitting, BP Cuff Size: Adult)   Pulse 82   Temp 36.7 °C (98 °F) (Temporal)   Resp 16   Ht 1.524 m (5')   Wt 69.8 kg (153 lb 12.8 oz)   SpO2 95%   BMI 30.04 kg/m²       Physical Exam  Vitals and nursing note reviewed.   Constitutional:       General: She is not in acute distress.     Appearance: Normal appearance.   HENT:      Head: Normocephalic and atraumatic.      Right Ear: Tympanic membrane, ear canal and external ear normal.      Left Ear: Tympanic membrane, ear canal and external ear normal.      Nose: Nose normal. No congestion or rhinorrhea.       Mouth/Throat:      Mouth: Mucous membranes are moist.      Pharynx: Oropharynx is clear.   Eyes:      General:         Right eye: No discharge.         Left eye: No discharge.      Extraocular Movements: Extraocular movements intact.      Conjunctiva/sclera: Conjunctivae normal.      Pupils: Pupils are equal, round, and reactive to light.   Cardiovascular:      Rate and Rhythm: Normal rate and regular rhythm.      Pulses: Normal pulses.      Heart sounds: Normal heart sounds. No murmur heard.     No friction rub. No gallop.   Pulmonary:      Effort: Pulmonary effort is normal. No respiratory distress.      Breath sounds: Normal breath sounds. No stridor. No wheezing, rhonchi or rales.   Chest:      Chest wall: No tenderness.   Abdominal:      General: Bowel sounds are normal.      Palpations: Abdomen is soft. There is no mass.      Tenderness: There is no abdominal tenderness. There is no guarding or rebound.   Musculoskeletal:         General: No swelling or deformity. Normal range of motion.      Cervical back: Normal range of motion and neck supple. No rigidity or tenderness.      Right lower leg: No edema.      Left lower leg: No edema.   Lymphadenopathy:      Cervical: No cervical adenopathy.   Skin:     General: Skin is warm and dry.      Coloration: Skin is not jaundiced.      Findings: No bruising or erythema.   Neurological:      General: No focal deficit present.      Mental Status: She is alert and oriented to person, place, and time. Mental status is at baseline.      Cranial Nerves: No cranial nerve deficit.      Motor: No weakness.      Coordination: Coordination normal.      Gait: Gait normal.   Psychiatric:         Mood and Affect: Mood normal.         Behavior: Behavior normal.         Thought Content: Thought content normal.         Judgment: Judgment normal.         Assessment & Plan  Routine general medical examination at health care facility    Orders:    1 Year Follow Up In [facility] - PCP - Wellness Exam; Future    Breast cancer screening by mammogram    Orders:    BI mammo bilateral screening tomosynthesis; Future    Asymptomatic postmenopausal status    Orders:    XR DEXA bone density; Future    Benign essential hypertension  Controlled. Continue current treatment.          Mixed hyperlipidemia  Clinically stable. Continue statin.         Hypothyroidism, unspecified type  Clinically stable. Continue levothyroxine.          Age related osteoporosis, unspecified pathological fracture presence  Please maintain enough calcium in your diet:  4424-4988 mg daily (consume foods rich in calcium rather than taking only calcium supplement to meet daily calcium requirements), take daily Vitamin D supplement with meal. Average Vit D dose is 2000 international units daily.  Weight bearing exercise routine is recommended.   Completed tx with oral bisphosphonate few years ago.          Paresis of single lower extremity (Multi)  Clinically stable, will monitor.   Diagnosed with MS in 1996.         Multiple sclerosis (Multi)  Clinically stable. Will monitor.   Diagnosed with MS in 1996.         Overactive bladder  Clinically stable. F/up with urology.         Lumbar stenosis with neurogenic claudication  Clinically improving, let me know if symptoms worsen.           It was a pleasure to see you today.  I would like to remind you about importance of a healthy lifestyle in order to improve your well-being and live longer.  Try to engage in physical activities for at least 150 minutes per week.  Eat about 10 servings of fruits and vegetables daily. My advice is 2 servings of fruits and 8 servings of vegetables.  For vegetables choose at least half of them green and at least half of them fresh.  Please avoid sugar, salt, fried food and saturated fat.      F/up in 6 months or sooner if needed.

## 2024-11-18 ENCOUNTER — TELEPHONE (OUTPATIENT)
Dept: OBSTETRICS AND GYNECOLOGY | Facility: CLINIC | Age: 79
End: 2024-11-18
Payer: MEDICARE

## 2024-11-18 DIAGNOSIS — N32.81 OVERACTIVE BLADDER: ICD-10-CM

## 2024-11-18 RX ORDER — MIRABEGRON 50 MG/1
50 TABLET, FILM COATED, EXTENDED RELEASE ORAL DAILY
Qty: 90 TABLET | Refills: 3 | Status: SHIPPED | OUTPATIENT
Start: 2024-11-18

## 2024-11-18 NOTE — TELEPHONE ENCOUNTER
Pt contacted.    Pt states she has been doubling up on her 25mg myrbetriq to see if the increased dose  helps her more.  Pt states it does/has.  At last visit in 9/2024, gus documented that pt was instructed to call office if wanted the 50mg dose,  Order placed in system and sent to provider to approve.

## 2024-11-24 DIAGNOSIS — E78.2 MIXED HYPERLIPIDEMIA: ICD-10-CM

## 2024-11-24 DIAGNOSIS — I10 BENIGN ESSENTIAL HYPERTENSION: ICD-10-CM

## 2024-11-25 RX ORDER — METOPROLOL SUCCINATE 25 MG/1
25 TABLET, EXTENDED RELEASE ORAL DAILY
Qty: 90 TABLET | Refills: 3 | Status: SHIPPED | OUTPATIENT
Start: 2024-11-25

## 2024-11-25 RX ORDER — PRAVASTATIN SODIUM 20 MG/1
20 TABLET ORAL DAILY
Qty: 90 TABLET | Refills: 3 | Status: SHIPPED | OUTPATIENT
Start: 2024-11-25

## 2024-11-29 ENCOUNTER — TELEPHONE (OUTPATIENT)
Dept: OBSTETRICS AND GYNECOLOGY | Facility: CLINIC | Age: 79
End: 2024-11-29
Payer: MEDICARE

## 2024-11-29 NOTE — TELEPHONE ENCOUNTER
Pt called stating that myrbetriq 50mg needs a PA with [employer]. PA initiated via epic. Pt is asking for a 25mg RF while she awaits PA reply. Will phone in 25mg RF if PA reply will be delayed.

## 2024-11-29 NOTE — TELEPHONE ENCOUNTER
Call made to [facility] PA line [phone]. Verbal PA was initiated with rep and reply should be in 72hrs. Ref #[id number]    12/2/24 Fax received from [facility] stating that myrbetriq 50mg is on pt list of covered meds. PA is not require at this time. Letter scanned into chart.

## 2024-12-09 ENCOUNTER — HOSPITAL ENCOUNTER (OUTPATIENT)
Dept: RADIOLOGY | Facility: CLINIC | Age: 79
Discharge: HOME | End: 2024-12-09
Payer: MEDICARE

## 2024-12-09 DIAGNOSIS — Z78.0 ASYMPTOMATIC POSTMENOPAUSAL STATUS: ICD-10-CM

## 2024-12-09 PROCEDURE — 77080 DXA BONE DENSITY AXIAL: CPT

## 2024-12-09 PROCEDURE — 77080 DXA BONE DENSITY AXIAL: CPT | Performed by: RADIOLOGY

## 2024-12-15 NOTE — RESULT ENCOUNTER NOTE
Your bone density scan showed osteopenia. Please maintain enough calcium in your diet:  2488-6727 mg daily (consume foods rich in calcium rather than taking only calcium supplement to meet daily calcium requirements), take daily Vitamin D supplement with meal. Average Vit D dose is 2000 international units daily.  Weight bearing exercise routine is recommended. We will discuss details during your next office visit.  Dr. Laureano

## 2024-12-16 ENCOUNTER — HOSPITAL ENCOUNTER (OUTPATIENT)
Dept: RADIOLOGY | Facility: CLINIC | Age: 79
Discharge: HOME | End: 2024-12-16
Payer: MEDICARE

## 2024-12-16 DIAGNOSIS — Z12.31 BREAST CANCER SCREENING BY MAMMOGRAM: ICD-10-CM

## 2024-12-16 PROCEDURE — 77063 BREAST TOMOSYNTHESIS BI: CPT | Performed by: RADIOLOGY

## 2024-12-16 PROCEDURE — 77067 SCR MAMMO BI INCL CAD: CPT | Performed by: RADIOLOGY

## 2024-12-16 PROCEDURE — 77067 SCR MAMMO BI INCL CAD: CPT

## 2025-05-14 ENCOUNTER — APPOINTMENT (OUTPATIENT)
Dept: PRIMARY CARE | Facility: CLINIC | Age: 80
End: 2025-05-14
Payer: MEDICARE

## 2025-05-14 VITALS
WEIGHT: 157.6 LBS | BODY MASS INDEX: 30.94 KG/M2 | HEIGHT: 60 IN | RESPIRATION RATE: 16 BRPM | SYSTOLIC BLOOD PRESSURE: 130 MMHG | OXYGEN SATURATION: 96 % | HEART RATE: 66 BPM | TEMPERATURE: 98 F | DIASTOLIC BLOOD PRESSURE: 60 MMHG

## 2025-05-14 DIAGNOSIS — M25.50 ARTHRALGIA, UNSPECIFIED JOINT: ICD-10-CM

## 2025-05-14 DIAGNOSIS — I10 BENIGN ESSENTIAL HYPERTENSION: ICD-10-CM

## 2025-05-14 DIAGNOSIS — E55.9 VITAMIN D DEFICIENCY: ICD-10-CM

## 2025-05-14 DIAGNOSIS — G35 MULTIPLE SCLEROSIS (MULTI): ICD-10-CM

## 2025-05-14 DIAGNOSIS — F41.1 GENERALIZED ANXIETY DISORDER: ICD-10-CM

## 2025-05-14 DIAGNOSIS — E03.9 HYPOTHYROIDISM, UNSPECIFIED TYPE: ICD-10-CM

## 2025-05-14 DIAGNOSIS — G83.10 PARESIS OF SINGLE LOWER EXTREMITY: ICD-10-CM

## 2025-05-14 DIAGNOSIS — M48.062 LUMBAR STENOSIS WITH NEUROGENIC CLAUDICATION: ICD-10-CM

## 2025-05-14 DIAGNOSIS — E78.2 MIXED HYPERLIPIDEMIA: Primary | ICD-10-CM

## 2025-05-14 DIAGNOSIS — M85.89 OSTEOPENIA OF MULTIPLE SITES: ICD-10-CM

## 2025-05-14 PROCEDURE — 1123F ACP DISCUSS/DSCN MKR DOCD: CPT | Performed by: INTERNAL MEDICINE

## 2025-05-14 PROCEDURE — 3078F DIAST BP <80 MM HG: CPT | Performed by: INTERNAL MEDICINE

## 2025-05-14 PROCEDURE — 1159F MED LIST DOCD IN RCRD: CPT | Performed by: INTERNAL MEDICINE

## 2025-05-14 PROCEDURE — 1036F TOBACCO NON-USER: CPT | Performed by: INTERNAL MEDICINE

## 2025-05-14 PROCEDURE — 1157F ADVNC CARE PLAN IN RCRD: CPT | Performed by: INTERNAL MEDICINE

## 2025-05-14 PROCEDURE — 3075F SYST BP GE 130 - 139MM HG: CPT | Performed by: INTERNAL MEDICINE

## 2025-05-14 PROCEDURE — 1160F RVW MEDS BY RX/DR IN RCRD: CPT | Performed by: INTERNAL MEDICINE

## 2025-05-14 PROCEDURE — 99214 OFFICE O/P EST MOD 30 MIN: CPT | Performed by: INTERNAL MEDICINE

## 2025-05-14 PROCEDURE — G2211 COMPLEX E/M VISIT ADD ON: HCPCS | Performed by: INTERNAL MEDICINE

## 2025-05-14 ASSESSMENT — ENCOUNTER SYMPTOMS
NAUSEA: 0
TREMORS: 0
DYSURIA: 0
HALLUCINATIONS: 0
EYE REDNESS: 0
SHORTNESS OF BREATH: 0
FACIAL ASYMMETRY: 0
BLOOD IN STOOL: 0
UNEXPECTED WEIGHT CHANGE: 0
WEAKNESS: 0
RESPIRATORY NEGATIVE: 1
LIGHT-HEADEDNESS: 0
FLANK PAIN: 0
TROUBLE SWALLOWING: 0
VOMITING: 0
WOUND: 0
COUGH: 0
PSYCHIATRIC NEGATIVE: 1
FREQUENCY: 0
ACTIVITY CHANGE: 0
NEUROLOGICAL NEGATIVE: 1
BACK PAIN: 1
DIZZINESS: 0
STRIDOR: 0
EYE DISCHARGE: 0
SINUS PAIN: 0
SINUS PRESSURE: 0
DIFFICULTY URINATING: 0
BRUISES/BLEEDS EASILY: 0
CONSTITUTIONAL NEGATIVE: 1
NECK PAIN: 0
CONSTIPATION: 0
ARTHRALGIAS: 1
VOICE CHANGE: 0
POLYDIPSIA: 0
APPETITE CHANGE: 0
NECK STIFFNESS: 0
ABDOMINAL PAIN: 0
POLYPHAGIA: 0
NUMBNESS: 0
DIARRHEA: 0
PALPITATIONS: 0
WHEEZING: 0
AGITATION: 0
SEIZURES: 0
NERVOUS/ANXIOUS: 0
CARDIOVASCULAR NEGATIVE: 1
ALLERGIC/IMMUNOLOGIC NEGATIVE: 1
ADENOPATHY: 0
ENDOCRINE NEGATIVE: 1
CONFUSION: 0
MYALGIAS: 0
SLEEP DISTURBANCE: 0
COLOR CHANGE: 0
JOINT SWELLING: 0
HEADACHES: 0
SPEECH DIFFICULTY: 0
HEMATOLOGIC/LYMPHATIC NEGATIVE: 1
GASTROINTESTINAL NEGATIVE: 1
EYE PAIN: 0
SORE THROAT: 0
EYES NEGATIVE: 1
CHEST TIGHTNESS: 0

## 2025-05-14 NOTE — PROGRESS NOTES
Subjective   Patient ID: Jenni Friedman is a 80 y.o. female who presents for Follow-up (Pt is here due to a 6 month follow up).    HPI     This is 79 yo patient with complex medical problems including HTN, HLD, Hypothyroidism, MS, OA.    We reviewed her medical conditions during today's visit.    Patient has been feeling pretty good and has been complaint with prescribed medications.    C/o insomnia, tried different OTC supplements without consistent effects.     We reviewed and discussed details of recent blood work: CBC, CMP, TSH, Lipid panel,  Vit D done in Nov 2024.  Results within acceptable range except elevated cholesterol and glucose.    Mammogram in Dec 2024 was neg.  DEXA in Dfec; 2024: osteopenia    She remains under a lot of family stress, lives alone, independent.  Received Measles vaccine booster at pharmacy in April 2025, tolerated well.     She  accidentally fell in October 2022 at home.  Sustained right wrist fracture.  Saw ortho Dr. Reynolds, required surgery which was performed in Nov 2022 at [facility].     Her CT CAC in 2017 was zero.   Recent colonoscopy (7/20/17) was unremarkable.      MS symptoms stable.  She was diagnosed with MS in 1996. Follows with neurology as needed.     Anxiety sx controlled with Lexapro.    Follows with urology regarding OAB.    Review of Systems   Constitutional: Negative.  Negative for activity change, appetite change and unexpected weight change.   HENT: Negative.  Negative for congestion, ear discharge, ear pain, hearing loss, mouth sores, nosebleeds, sinus pressure, sinus pain, sore throat, trouble swallowing and voice change.    Eyes: Negative.  Negative for pain, discharge, redness and visual disturbance.   Respiratory: Negative.  Negative for cough, chest tightness, shortness of breath, wheezing and stridor.    Cardiovascular: Negative.  Negative for chest pain, palpitations and leg swelling.   Gastrointestinal: Negative.  Negative for abdominal pain, blood  in stool, constipation, diarrhea, nausea and vomiting.   Endocrine: Negative.  Negative for polydipsia, polyphagia and polyuria.   Genitourinary: Negative.  Negative for difficulty urinating, dysuria, flank pain, frequency and urgency.   Musculoskeletal:  Positive for arthralgias and back pain. Negative for gait problem, joint swelling, myalgias, neck pain and neck stiffness.   Skin: Negative.  Negative for color change, rash and wound.   Allergic/Immunologic: Negative.  Negative for environmental allergies, food allergies and immunocompromised state.   Neurological: Negative.  Negative for dizziness, tremors, seizures, syncope, facial asymmetry, speech difficulty, weakness, light-headedness, numbness and headaches.   Hematological: Negative.  Negative for adenopathy. Does not bruise/bleed easily.   Psychiatric/Behavioral: Negative.  Negative for agitation, behavioral problems, confusion, hallucinations, sleep disturbance and suicidal ideas. The patient is not nervous/anxious.    All other systems reviewed and are negative.      Objective   /60 (BP Location: Left arm, Patient Position: Sitting, BP Cuff Size: Adult)   Pulse 66   Temp 36.7 °C (98 °F) (Temporal)   Resp 16   Ht 1.524 m (5')   Wt 71.5 kg (157 lb 9.6 oz)   SpO2 96%   BMI 30.78 kg/m²     Physical Exam  Vitals and nursing note reviewed.   Constitutional:       General: She is not in acute distress.     Appearance: Normal appearance.   HENT:      Head: Normocephalic and atraumatic.      Right Ear: External ear normal.      Left Ear: External ear normal.      Nose: Nose normal. No congestion or rhinorrhea.   Eyes:      General:         Right eye: No discharge.         Left eye: No discharge.      Extraocular Movements: Extraocular movements intact.      Conjunctiva/sclera: Conjunctivae normal.      Pupils: Pupils are equal, round, and reactive to light.   Cardiovascular:      Rate and Rhythm: Normal rate and regular rhythm.      Pulses: Normal  pulses.      Heart sounds: Normal heart sounds. No murmur heard.     No friction rub. No gallop.   Pulmonary:      Effort: Pulmonary effort is normal. No respiratory distress.      Breath sounds: Normal breath sounds. No stridor. No wheezing, rhonchi or rales.   Chest:      Chest wall: No tenderness.   Abdominal:      General: Bowel sounds are normal.      Palpations: Abdomen is soft. There is no mass.      Tenderness: There is no abdominal tenderness. There is no guarding or rebound.   Musculoskeletal:         General: No swelling or deformity. Normal range of motion.      Cervical back: Normal range of motion and neck supple. No rigidity or tenderness.      Right lower leg: No edema.      Left lower leg: No edema.   Lymphadenopathy:      Cervical: No cervical adenopathy.   Skin:     General: Skin is warm and dry.      Coloration: Skin is not jaundiced.      Findings: No bruising or erythema.   Neurological:      General: No focal deficit present.      Mental Status: She is alert and oriented to person, place, and time. Mental status is at baseline.      Cranial Nerves: No cranial nerve deficit.      Motor: No weakness.      Coordination: Coordination normal.      Gait: Gait normal.   Psychiatric:         Mood and Affect: Mood normal.         Behavior: Behavior normal.         Thought Content: Thought content normal.         Judgment: Judgment normal.         Assessment/Plan   Problem List Items Addressed This Visit           ICD-10-CM       Cardiac and Vasculature    Benign essential hypertension I10    Controlled. Continue current treatment.                Hyperlipidemia - Primary E78.5    Clinically stable. Continue statin.               Relevant Orders    Comprehensive Metabolic Panel    Lipid Panel       Endocrine/Metabolic    Hypothyroidism E03.9    Clinically stable. Continue levothyroxine.                Relevant Orders    TSH with reflex to Free T4 if abnormal    Osteopenia of multiple sites M85.89     Please maintain enough calcium in your diet:  0102-5398 mg daily (consume foods rich in calcium rather than taking only calcium supplement to meet daily calcium requirements), take daily Vitamin D supplement with meal. Average Vit D dose is 2000 international units daily.  Weight bearing exercise routine is recommended.             Mental Health    Anxiety disorder F41.9    Clinically stable. Continue lexapro.             Musculoskeletal and Injuries    Arthralgia M25.50    Relevant Orders    CBC       Neuro    Lumbar stenosis with neurogenic claudication M48.062    Clinically improving, let me know if symptoms worsen.                Multiple sclerosis (Multi) G35    Clinically stable. Will monitor.   Diagnosed with MS in 1996.               Paresis of single lower extremity G83.10    Stable. Fall precautions advised.          Other Visit Diagnoses         Codes      Vitamin D deficiency     E55.9    Relevant Orders    Vitamin D 25-Hydroxy,Total (for eval of Vitamin D levels)        It was a pleasure to see you today.  I would like to remind you about importance of a healthy lifestyle in order to improve your well-being and live longer.  Try to engage in physical activities for at least 150 minutes per week.  Eat about 10 servings of fruits and vegetables daily. My advice is 2 servings of fruits and 8 servings of vegetables.  For vegetables choose at least half of them green and at least half of them fresh.  Please avoid sugar, salt, fried food and saturated fat.    I spent a total of 30 minutes on the date of service for follow up visit, which included preparing to see the patient, face-to-face patient care, completing clinical documentation, obtaining and/or reviewing separately obtained history, performing a medically appropriate examination, counseling and educating the patient/family/caregiver, ordering medications, tests, or procedures, communicating with other health care providers (not separately reported),  independently interpreting results (not separately reported), communicating results to the patient/family/caregiver, and care coordination (not separately reported).      F/up in 6 months or sooner if needed.

## 2025-05-14 NOTE — ASSESSMENT & PLAN NOTE
Please maintain enough calcium in your diet:  9985-0636 mg daily (consume foods rich in calcium rather than taking only calcium supplement to meet daily calcium requirements), take daily Vitamin D supplement with meal. Average Vit D dose is 2000 international units daily.  Weight bearing exercise routine is recommended.

## 2025-08-21 DIAGNOSIS — E03.9 HYPOTHYROIDISM, UNSPECIFIED TYPE: ICD-10-CM

## 2025-08-21 DIAGNOSIS — F41.9 ANXIETY: ICD-10-CM

## 2025-08-21 RX ORDER — ESCITALOPRAM OXALATE 10 MG/1
10 TABLET ORAL DAILY
Qty: 90 TABLET | Refills: 3 | Status: SHIPPED | OUTPATIENT
Start: 2025-08-21

## 2025-08-21 RX ORDER — LEVOTHYROXINE SODIUM 75 UG/1
75 TABLET ORAL DAILY
Qty: 90 TABLET | Refills: 3 | Status: SHIPPED | OUTPATIENT
Start: 2025-08-21

## 2025-11-04 ENCOUNTER — APPOINTMENT (OUTPATIENT)
Dept: PRIMARY CARE | Facility: CLINIC | Age: 80
End: 2025-11-04
Payer: MEDICARE

## 2025-11-26 ENCOUNTER — APPOINTMENT (OUTPATIENT)
Dept: PRIMARY CARE | Facility: CLINIC | Age: 80
End: 2025-11-26
Payer: MEDICARE